# Patient Record
Sex: FEMALE | Race: WHITE | NOT HISPANIC OR LATINO | Employment: PART TIME | URBAN - METROPOLITAN AREA
[De-identification: names, ages, dates, MRNs, and addresses within clinical notes are randomized per-mention and may not be internally consistent; named-entity substitution may affect disease eponyms.]

---

## 2017-05-12 ENCOUNTER — HOSPITAL ENCOUNTER (OUTPATIENT)
Facility: MEDICAL CENTER | Age: 42
End: 2017-05-12
Attending: CLINICAL NURSE SPECIALIST
Payer: COMMERCIAL

## 2017-05-12 PROCEDURE — 87070 CULTURE OTHR SPECIMN AEROBIC: CPT

## 2017-05-12 PROCEDURE — 87205 SMEAR GRAM STAIN: CPT

## 2017-05-14 LAB
GRAM STN SPEC: NORMAL
SIGNIFICANT IND 70042: NORMAL
SITE SITE: NORMAL
SOURCE SOURCE: NORMAL

## 2017-05-16 LAB
BACTERIA GENITAL AEROBE CULT: NORMAL
GRAM STN SPEC: NORMAL
SIGNIFICANT IND 70042: NORMAL
SITE SITE: NORMAL
SOURCE SOURCE: NORMAL

## 2017-08-18 ENCOUNTER — HOSPITAL ENCOUNTER (OUTPATIENT)
Dept: RADIOLOGY | Facility: MEDICAL CENTER | Age: 42
End: 2017-08-18
Attending: CLINICAL NURSE SPECIALIST
Payer: COMMERCIAL

## 2017-08-18 DIAGNOSIS — N64.4 MASTODYNIA: ICD-10-CM

## 2017-08-18 PROCEDURE — 76642 ULTRASOUND BREAST LIMITED: CPT | Mod: LT

## 2017-08-18 PROCEDURE — G0204 DX MAMMO INCL CAD BI: HCPCS

## 2017-08-30 ENCOUNTER — HOSPITAL ENCOUNTER (OUTPATIENT)
Dept: LAB | Facility: MEDICAL CENTER | Age: 42
End: 2017-08-30
Attending: NURSE PRACTITIONER
Payer: COMMERCIAL

## 2017-08-30 LAB
FASTING STATUS PATIENT QL REPORTED: NORMAL
GFR SERPL CREATININE-BSD FRML MDRD: >60 ML/MIN/1.73 M 2

## 2017-08-30 PROCEDURE — 80061 LIPID PANEL: CPT

## 2017-08-30 PROCEDURE — 87086 URINE CULTURE/COLONY COUNT: CPT

## 2017-08-30 PROCEDURE — 84550 ASSAY OF BLOOD/URIC ACID: CPT

## 2017-08-30 PROCEDURE — 85652 RBC SED RATE AUTOMATED: CPT

## 2017-08-30 PROCEDURE — 81001 URINALYSIS AUTO W/SCOPE: CPT

## 2017-08-30 PROCEDURE — 36415 COLL VENOUS BLD VENIPUNCTURE: CPT

## 2017-08-30 PROCEDURE — 86431 RHEUMATOID FACTOR QUANT: CPT

## 2017-08-30 PROCEDURE — 85025 COMPLETE CBC W/AUTO DIFF WBC: CPT

## 2017-08-30 PROCEDURE — 82306 VITAMIN D 25 HYDROXY: CPT

## 2017-08-30 PROCEDURE — 80053 COMPREHEN METABOLIC PANEL: CPT

## 2017-08-30 PROCEDURE — 86038 ANTINUCLEAR ANTIBODIES: CPT

## 2017-08-30 PROCEDURE — 84443 ASSAY THYROID STIM HORMONE: CPT

## 2017-08-31 LAB
25(OH)D3 SERPL-MCNC: 34 NG/ML (ref 30–100)
ALBUMIN SERPL BCP-MCNC: 4.2 G/DL (ref 3.2–4.9)
ALBUMIN/GLOB SERPL: 1.6 G/DL
ALP SERPL-CCNC: 36 U/L (ref 30–99)
ALT SERPL-CCNC: 11 U/L (ref 2–50)
ANION GAP SERPL CALC-SCNC: 4 MMOL/L (ref 0–11.9)
APPEARANCE UR: CLEAR
AST SERPL-CCNC: 15 U/L (ref 12–45)
BACTERIA #/AREA URNS HPF: ABNORMAL /HPF
BASOPHILS # BLD AUTO: 0.7 % (ref 0–1.8)
BASOPHILS # BLD: 0.04 K/UL (ref 0–0.12)
BILIRUB SERPL-MCNC: 1.3 MG/DL (ref 0.1–1.5)
BILIRUB UR QL STRIP.AUTO: NEGATIVE
BUN SERPL-MCNC: 11 MG/DL (ref 8–22)
CALCIUM SERPL-MCNC: 9.2 MG/DL (ref 8.5–10.5)
CHLORIDE SERPL-SCNC: 106 MMOL/L (ref 96–112)
CHOLEST SERPL-MCNC: 138 MG/DL (ref 100–199)
CO2 SERPL-SCNC: 25 MMOL/L (ref 20–33)
COLOR UR: YELLOW
CREAT SERPL-MCNC: 0.71 MG/DL (ref 0.5–1.4)
CULTURE IF INDICATED INDCX: YES UA CULTURE
EOSINOPHIL # BLD AUTO: 0.06 K/UL (ref 0–0.51)
EOSINOPHIL NFR BLD: 1 % (ref 0–6.9)
EPI CELLS #/AREA URNS HPF: NEGATIVE /HPF
ERYTHROCYTE [DISTWIDTH] IN BLOOD BY AUTOMATED COUNT: 41.1 FL (ref 35.9–50)
ERYTHROCYTE [SEDIMENTATION RATE] IN BLOOD BY WESTERGREN METHOD: 10 MM/HOUR (ref 0–20)
GLOBULIN SER CALC-MCNC: 2.7 G/DL (ref 1.9–3.5)
GLUCOSE SERPL-MCNC: 74 MG/DL (ref 65–99)
GLUCOSE UR STRIP.AUTO-MCNC: NEGATIVE MG/DL
HCT VFR BLD AUTO: 41 % (ref 37–47)
HDLC SERPL-MCNC: 51 MG/DL
HGB BLD-MCNC: 13.8 G/DL (ref 12–16)
HYALINE CASTS #/AREA URNS LPF: ABNORMAL /LPF
IMM GRANULOCYTES # BLD AUTO: 0.02 K/UL (ref 0–0.11)
IMM GRANULOCYTES NFR BLD AUTO: 0.3 % (ref 0–0.9)
KETONES UR STRIP.AUTO-MCNC: 15 MG/DL
LDLC SERPL CALC-MCNC: 73 MG/DL
LEUKOCYTE ESTERASE UR QL STRIP.AUTO: ABNORMAL
LYMPHOCYTES # BLD AUTO: 1.74 K/UL (ref 1–4.8)
LYMPHOCYTES NFR BLD: 29.7 % (ref 22–41)
MCH RBC QN AUTO: 31.2 PG (ref 27–33)
MCHC RBC AUTO-ENTMCNC: 33.7 G/DL (ref 33.6–35)
MCV RBC AUTO: 92.6 FL (ref 81.4–97.8)
MICRO URNS: ABNORMAL
MONOCYTES # BLD AUTO: 0.5 K/UL (ref 0–0.85)
MONOCYTES NFR BLD AUTO: 8.5 % (ref 0–13.4)
NEUTROPHILS # BLD AUTO: 3.5 K/UL (ref 2–7.15)
NEUTROPHILS NFR BLD: 59.8 % (ref 44–72)
NITRITE UR QL STRIP.AUTO: NEGATIVE
NRBC # BLD AUTO: 0 K/UL
NRBC BLD AUTO-RTO: 0 /100 WBC
PH UR STRIP.AUTO: 6 [PH]
PLATELET # BLD AUTO: 258 K/UL (ref 164–446)
PMV BLD AUTO: 10.4 FL (ref 9–12.9)
POTASSIUM SERPL-SCNC: 4.1 MMOL/L (ref 3.6–5.5)
PROT SERPL-MCNC: 6.9 G/DL (ref 6–8.2)
PROT UR QL STRIP: NEGATIVE MG/DL
RBC # BLD AUTO: 4.43 M/UL (ref 4.2–5.4)
RBC # URNS HPF: ABNORMAL /HPF
RBC UR QL AUTO: ABNORMAL
RHEUMATOID FACT SER IA-ACNC: <10 IU/ML (ref 0–14)
SODIUM SERPL-SCNC: 135 MMOL/L (ref 135–145)
SP GR UR STRIP.AUTO: 1.02
TRIGL SERPL-MCNC: 68 MG/DL (ref 0–149)
TSH SERPL DL<=0.005 MIU/L-ACNC: 2.05 UIU/ML (ref 0.3–3.7)
URATE SERPL-MCNC: 4.3 MG/DL (ref 1.9–8.2)
UROBILINOGEN UR STRIP.AUTO-MCNC: 0.2 MG/DL
WBC # BLD AUTO: 5.9 K/UL (ref 4.8–10.8)
WBC #/AREA URNS HPF: ABNORMAL /HPF

## 2017-09-01 LAB
BACTERIA UR CULT: NORMAL
NUCLEAR IGG SER QL IA: NORMAL
SIGNIFICANT IND 70042: NORMAL
SOURCE SOURCE: NORMAL

## 2018-01-22 ENCOUNTER — OFFICE VISIT (OUTPATIENT)
Dept: URGENT CARE | Facility: PHYSICIAN GROUP | Age: 43
End: 2018-01-22
Payer: COMMERCIAL

## 2018-01-22 VITALS
OXYGEN SATURATION: 97 % | HEART RATE: 95 BPM | RESPIRATION RATE: 14 BRPM | SYSTOLIC BLOOD PRESSURE: 112 MMHG | DIASTOLIC BLOOD PRESSURE: 76 MMHG | TEMPERATURE: 100.1 F | WEIGHT: 156 LBS

## 2018-01-22 DIAGNOSIS — G93.31 POST-INFLUENZA SYNDROME: ICD-10-CM

## 2018-01-22 DIAGNOSIS — R06.02 SOB (SHORTNESS OF BREATH): ICD-10-CM

## 2018-01-22 DIAGNOSIS — J22 LOWER RESPIRATORY INFECTION (E.G., BRONCHITIS, PNEUMONIA, PNEUMONITIS, PULMONITIS): Primary | ICD-10-CM

## 2018-01-22 PROCEDURE — 94760 N-INVAS EAR/PLS OXIMETRY 1: CPT | Performed by: PHYSICIAN ASSISTANT

## 2018-01-22 PROCEDURE — 94640 AIRWAY INHALATION TREATMENT: CPT | Performed by: PHYSICIAN ASSISTANT

## 2018-01-22 PROCEDURE — 99204 OFFICE O/P NEW MOD 45 MIN: CPT | Mod: 25 | Performed by: PHYSICIAN ASSISTANT

## 2018-01-22 RX ORDER — ALBUTEROL SULFATE 90 UG/1
2 AEROSOL, METERED RESPIRATORY (INHALATION) EVERY 4 HOURS PRN
Qty: 1 INHALER | Refills: 0 | Status: SHIPPED | OUTPATIENT
Start: 2018-01-22

## 2018-01-22 RX ORDER — METHYLPREDNISOLONE 4 MG/1
TABLET ORAL
Qty: 1 KIT | Refills: 0 | Status: SHIPPED | OUTPATIENT
Start: 2018-01-22 | End: 2021-08-14

## 2018-01-22 RX ORDER — ALBUTEROL SULFATE 2.5 MG/3ML
2.5 SOLUTION RESPIRATORY (INHALATION) ONCE
Status: COMPLETED | OUTPATIENT
Start: 2018-01-22 | End: 2018-01-22

## 2018-01-22 RX ORDER — AZITHROMYCIN 250 MG/1
TABLET, FILM COATED ORAL
Qty: 6 TAB | Refills: 0 | Status: SHIPPED | OUTPATIENT
Start: 2018-01-22 | End: 2021-08-14

## 2018-01-22 RX ADMIN — ALBUTEROL SULFATE 2.5 MG: 2.5 SOLUTION RESPIRATORY (INHALATION) at 18:22

## 2018-01-22 ASSESSMENT — ENCOUNTER SYMPTOMS
CARDIOVASCULAR NEGATIVE: 1
FEVER: 1
COUGH: 1
SPUTUM PRODUCTION: 1
CHILLS: 1
MUSCULOSKELETAL NEGATIVE: 1
WHEEZING: 1
EYES NEGATIVE: 1
SHORTNESS OF BREATH: 1
GASTROINTESTINAL NEGATIVE: 1

## 2018-01-23 NOTE — PROGRESS NOTES
Subjective:      Syeda Norton is a 42 y.o. female who presents with Cough (SOB X7 DAYS)            HPI  Chief Complaint   Patient presents with   • Cough     SOB X7 DAYS       HPI:  Syeda Norton is a 42 y.o. Female who presents with lower resp infection x 7 days.  Started with flu with temp 101 max.  Has been out of work recovering.  HAving worsening chest congestion and wheeze and yellow mucous changes.  Fever returned today.  Has not tried any otc medications.  Patient denies HA, chest pain, palpitations, or n/v/d.  '  No flu vaccine.    No hx of pne or bronchitis.    History reviewed. No pertinent past medical history.    History reviewed. No pertinent surgical history.    History reviewed. No pertinent family history.  No pertinent family history.    Social History     Social History   • Marital status:      Spouse name: N/A   • Number of children: N/A   • Years of education: N/A     Occupational History   • Not on file.     Social History Main Topics   • Smoking status: Never Smoker   • Smokeless tobacco: Never Used   • Alcohol use Yes   • Drug use: No   • Sexual activity: Not on file     Other Topics Concern   • Not on file     Social History Narrative   • No narrative on file         Current Outpatient Prescriptions:   •  albuterol, 2 Puff, Inhalation, Q4HRS PRN  •  azithromycin, 2 tablets on day one, then 1 tablet PO daily until done.  •  MethylPREDNISolone, Take according to box instructions    No Known Allergies     Review of Systems   Constitutional: Positive for chills and fever.   HENT: Positive for congestion.    Eyes: Negative.    Respiratory: Positive for cough, sputum production, shortness of breath and wheezing.    Cardiovascular: Negative.    Gastrointestinal: Negative.    Genitourinary: Negative.    Musculoskeletal: Negative.    Skin: Negative.    All other systems reviewed and are negative.         Objective:     /76   Pulse 95   Temp 37.8 °C (100.1 °F)   Resp 14    Wt 70.8 kg (156 lb)   SpO2 97%   Breastfeeding? No      Physical Exam       Nursing note and vitals reviewed.    Constitutional:   Appropriately groomed, pleasant affect, well nourished, in NAD.    Head:   Normocephalic, atraumatic.    Eyes:   PERRLA, EOM's full, sclera white, conjunctiva not erythematous, and medial canthus without exudate bilaterally.    Ears:  Auricle and tragus non-tender to manipulation.  No pre-auricular lymphadenopathy or mastoid ttp.  EACs with mild cerumen bilaterally, not erythematous.  TM’s pearly gray with cone of light present and umbo and malleolus visible bilaterally.  No bulging or fluid bubbles present in middle ear.  Hearing grossly intact to voice.    Nose:  Nares not patent bilaterally.  Nasal mucosa edematous with white rhinorrhea bilaterally.  Mild sinus tenderness to percussion.    Throat:  Dentition wnl, mucosa moist without lesions.  Oropharynx erythematous, with no enlargement of the palatine tonsils bilaterally with no exudates.    Post nasal drainage  present.  Soft palate rises symmetrically bilaterally and uvula midline.      Neck: Neck supple, with mild anterior lymphadenopathy that is soft and mobile to palpation. Thyroid non-palpable without tenderness or nodules. No supraclavicular lymphadenopathy.    Lungs:  Respiratory effort not labored without accessory muscle use.  Lungs with inspiratory wheezes to auscultation. No rales. Rhonchi cleared with cough.    Heart:  RRR, without murmurs rubs or gallops.  Radial and dorsalis pedis pulse 2+ bilaterally.  No LE edema.    Musculoskeletal:  Gait non-antalgic with a narrow base.    Derm:  Skin without rashes or lesions with good turgor pressure.      Psychiatric:  Mood, affect, and judgement appropriate.       Assessment/Plan:     1. Lower respiratory infection (e.g., bronchitis, pneumonia, pneumonitis, pulmonitis)  albuterol 108 (90 Base) MCG/ACT Aero Soln inhalation aerosol    azithromycin (ZITHROMAX) 250 MG Tab     MethylPREDNISolone (MEDROL DOSEPAK) 4 MG Tablet Therapy Pack    albuterol (PROVENTIL) 2.5mg/3ml nebulizer solution 2.5 mg   2. SOB (shortness of breath)  albuterol 108 (90 Base) MCG/ACT Aero Soln inhalation aerosol    azithromycin (ZITHROMAX) 250 MG Tab    MethylPREDNISolone (MEDROL DOSEPAK) 4 MG Tablet Therapy Pack    albuterol (PROVENTIL) 2.5mg/3ml nebulizer solution 2.5 mg   3. Post-influenza syndrome  albuterol 108 (90 Base) MCG/ACT Aero Soln inhalation aerosol    azithromycin (ZITHROMAX) 250 MG Tab    MethylPREDNISolone (MEDROL DOSEPAK) 4 MG Tablet Therapy Pack    albuterol (PROVENTIL) 2.5mg/3ml nebulizer solution 2.5 mg      Patient presents with concern for early pne.  Post influenza based on symptoms.  On exam patient with sig wheeze and questionable crackles.  Pt refused X-ray.  Post albuterol neb 02 improved to 100%RA, adequate.  Recommended pushing fluids and delsym cough syrup.  Rx inhaler and medrol dosepak.  Plan to cover empirically for CAP.    Patient was in agreement with this treatment plan and seemed to understand without barriers. All questions were encouraged and answered.  Reviewed signs and symptoms of when to seek emergency medical care.     Please note that this dictation was created using voice recognition software.  I have made every reasonable attempt to correct obvious errors, but I expect there are errors of alin and possibly content that I did not discover before finalizing the note.

## 2018-01-23 NOTE — PATIENT INSTRUCTIONS
Flonase and nasal saline irrigation (netti pot or Jerrell Med Sinus Rinse).  Used distilled water or boiled tap water with nasal flushes, not straight tap water.  Humidifier at bedtime.  Hot steam showers to loosen up mucous.  Cough medicine at bedtime.  Lots of fluids, tea with honey.  Ibuprofen for headache, fever, chills.  Be sure to take with food.  Return if worsening: Yellow thicker mucus changes, worsening pain around the eyes and radiates to the teeth, and fever over 101°F.

## 2018-08-17 ENCOUNTER — HOSPITAL ENCOUNTER (OUTPATIENT)
Dept: RADIOLOGY | Facility: MEDICAL CENTER | Age: 43
End: 2018-08-17
Attending: NURSE PRACTITIONER
Payer: COMMERCIAL

## 2018-08-17 DIAGNOSIS — N64.4 MASTODYNIA: ICD-10-CM

## 2018-08-17 PROCEDURE — G0279 TOMOSYNTHESIS, MAMMO: HCPCS

## 2018-08-17 PROCEDURE — 76642 ULTRASOUND BREAST LIMITED: CPT | Mod: LT

## 2018-09-11 ENCOUNTER — HOSPITAL ENCOUNTER (OUTPATIENT)
Dept: LAB | Facility: MEDICAL CENTER | Age: 43
End: 2018-09-11
Attending: NURSE PRACTITIONER
Payer: COMMERCIAL

## 2018-09-11 LAB
25(OH)D3 SERPL-MCNC: 33 NG/ML (ref 30–100)
ALBUMIN SERPL BCP-MCNC: 4.5 G/DL (ref 3.2–4.9)
ALBUMIN/GLOB SERPL: 1.7 G/DL
ALP SERPL-CCNC: 37 U/L (ref 30–99)
ALT SERPL-CCNC: 11 U/L (ref 2–50)
ANION GAP SERPL CALC-SCNC: 7 MMOL/L (ref 0–11.9)
APPEARANCE UR: CLEAR
AST SERPL-CCNC: 14 U/L (ref 12–45)
BASOPHILS # BLD AUTO: 0.8 % (ref 0–1.8)
BASOPHILS # BLD: 0.05 K/UL (ref 0–0.12)
BILIRUB SERPL-MCNC: 0.4 MG/DL (ref 0.1–1.5)
BILIRUB UR QL STRIP.AUTO: NEGATIVE
BUN SERPL-MCNC: 13 MG/DL (ref 8–22)
CALCIUM SERPL-MCNC: 9.7 MG/DL (ref 8.5–10.5)
CHLORIDE SERPL-SCNC: 105 MMOL/L (ref 96–112)
CHOLEST SERPL-MCNC: 144 MG/DL (ref 100–199)
CO2 SERPL-SCNC: 27 MMOL/L (ref 20–33)
COLOR UR: YELLOW
CREAT SERPL-MCNC: 0.8 MG/DL (ref 0.5–1.4)
EOSINOPHIL # BLD AUTO: 0.04 K/UL (ref 0–0.51)
EOSINOPHIL NFR BLD: 0.6 % (ref 0–6.9)
ERYTHROCYTE [DISTWIDTH] IN BLOOD BY AUTOMATED COUNT: 40 FL (ref 35.9–50)
FASTING STATUS PATIENT QL REPORTED: NORMAL
GLOBULIN SER CALC-MCNC: 2.6 G/DL (ref 1.9–3.5)
GLUCOSE SERPL-MCNC: 78 MG/DL (ref 65–99)
GLUCOSE UR STRIP.AUTO-MCNC: NEGATIVE MG/DL
HCT VFR BLD AUTO: 42.9 % (ref 37–47)
HDLC SERPL-MCNC: 47 MG/DL
HGB BLD-MCNC: 14.5 G/DL (ref 12–16)
IMM GRANULOCYTES # BLD AUTO: 0.02 K/UL (ref 0–0.11)
IMM GRANULOCYTES NFR BLD AUTO: 0.3 % (ref 0–0.9)
KETONES UR STRIP.AUTO-MCNC: NEGATIVE MG/DL
LDLC SERPL CALC-MCNC: 88 MG/DL
LEUKOCYTE ESTERASE UR QL STRIP.AUTO: NEGATIVE
LYMPHOCYTES # BLD AUTO: 1.5 K/UL (ref 1–4.8)
LYMPHOCYTES NFR BLD: 22.7 % (ref 22–41)
MCH RBC QN AUTO: 31 PG (ref 27–33)
MCHC RBC AUTO-ENTMCNC: 33.8 G/DL (ref 33.6–35)
MCV RBC AUTO: 91.9 FL (ref 81.4–97.8)
MICRO URNS: NORMAL
MONOCYTES # BLD AUTO: 0.37 K/UL (ref 0–0.85)
MONOCYTES NFR BLD AUTO: 5.6 % (ref 0–13.4)
NEUTROPHILS # BLD AUTO: 4.62 K/UL (ref 2–7.15)
NEUTROPHILS NFR BLD: 70 % (ref 44–72)
NITRITE UR QL STRIP.AUTO: NEGATIVE
NRBC # BLD AUTO: 0 K/UL
NRBC BLD-RTO: 0 /100 WBC
PH UR STRIP.AUTO: 6.5 [PH]
PLATELET # BLD AUTO: 302 K/UL (ref 164–446)
PMV BLD AUTO: 10.3 FL (ref 9–12.9)
POTASSIUM SERPL-SCNC: 4 MMOL/L (ref 3.6–5.5)
PROT SERPL-MCNC: 7.1 G/DL (ref 6–8.2)
PROT UR QL STRIP: NEGATIVE MG/DL
RBC # BLD AUTO: 4.67 M/UL (ref 4.2–5.4)
RBC UR QL AUTO: NEGATIVE
SODIUM SERPL-SCNC: 139 MMOL/L (ref 135–145)
SP GR UR STRIP.AUTO: 1.01
T4 FREE SERPL-MCNC: 0.86 NG/DL (ref 0.53–1.43)
THYROPEROXIDASE AB SERPL-ACNC: 0.5 IU/ML (ref 0–9)
TRIGL SERPL-MCNC: 46 MG/DL (ref 0–149)
TSH SERPL DL<=0.005 MIU/L-ACNC: 2.05 UIU/ML (ref 0.38–5.33)
UROBILINOGEN UR STRIP.AUTO-MCNC: 0.2 MG/DL
VIT B12 SERPL-MCNC: 332 PG/ML (ref 211–911)
WBC # BLD AUTO: 6.6 K/UL (ref 4.8–10.8)

## 2018-09-11 PROCEDURE — 84443 ASSAY THYROID STIM HORMONE: CPT

## 2018-09-11 PROCEDURE — 82306 VITAMIN D 25 HYDROXY: CPT

## 2018-09-11 PROCEDURE — 81003 URINALYSIS AUTO W/O SCOPE: CPT

## 2018-09-11 PROCEDURE — 86376 MICROSOMAL ANTIBODY EACH: CPT

## 2018-09-11 PROCEDURE — 84439 ASSAY OF FREE THYROXINE: CPT

## 2018-09-11 PROCEDURE — 80053 COMPREHEN METABOLIC PANEL: CPT

## 2018-09-11 PROCEDURE — 80061 LIPID PANEL: CPT

## 2018-09-11 PROCEDURE — 36415 COLL VENOUS BLD VENIPUNCTURE: CPT

## 2018-09-11 PROCEDURE — 85025 COMPLETE CBC W/AUTO DIFF WBC: CPT

## 2018-09-11 PROCEDURE — 82607 VITAMIN B-12: CPT

## 2021-08-12 ENCOUNTER — HOSPITAL ENCOUNTER (OUTPATIENT)
Facility: MEDICAL CENTER | Age: 46
End: 2021-08-12
Attending: PHYSICIAN ASSISTANT
Payer: COMMERCIAL

## 2021-08-12 ENCOUNTER — OFFICE VISIT (OUTPATIENT)
Dept: URGENT CARE | Facility: PHYSICIAN GROUP | Age: 46
End: 2021-08-12
Payer: COMMERCIAL

## 2021-08-12 VITALS
TEMPERATURE: 101.1 F | OXYGEN SATURATION: 98 % | BODY MASS INDEX: 27.49 KG/M2 | DIASTOLIC BLOOD PRESSURE: 82 MMHG | WEIGHT: 165 LBS | RESPIRATION RATE: 20 BRPM | HEIGHT: 65 IN | HEART RATE: 100 BPM | SYSTOLIC BLOOD PRESSURE: 126 MMHG

## 2021-08-12 DIAGNOSIS — G44.89 OTHER HEADACHE SYNDROME: ICD-10-CM

## 2021-08-12 DIAGNOSIS — R11.2 NAUSEA AND VOMITING, INTRACTABILITY OF VOMITING NOT SPECIFIED, UNSPECIFIED VOMITING TYPE: ICD-10-CM

## 2021-08-12 DIAGNOSIS — R05.9 COUGH: ICD-10-CM

## 2021-08-12 DIAGNOSIS — R50.9 FEVER, UNSPECIFIED FEVER CAUSE: ICD-10-CM

## 2021-08-12 DIAGNOSIS — B34.9 NONSPECIFIC SYNDROME SUGGESTIVE OF VIRAL ILLNESS: ICD-10-CM

## 2021-08-12 PROCEDURE — U0003 INFECTIOUS AGENT DETECTION BY NUCLEIC ACID (DNA OR RNA); SEVERE ACUTE RESPIRATORY SYNDROME CORONAVIRUS 2 (SARS-COV-2) (CORONAVIRUS DISEASE [COVID-19]), AMPLIFIED PROBE TECHNIQUE, MAKING USE OF HIGH THROUGHPUT TECHNOLOGIES AS DESCRIBED BY CMS-2020-01-R: HCPCS

## 2021-08-12 PROCEDURE — U0005 INFEC AGEN DETEC AMPLI PROBE: HCPCS

## 2021-08-12 PROCEDURE — 99214 OFFICE O/P EST MOD 30 MIN: CPT | Performed by: PHYSICIAN ASSISTANT

## 2021-08-12 RX ORDER — HYDROXYCHLOROQUINE SULFATE 200 MG/1
TABLET, FILM COATED ORAL
COMMUNITY
Start: 2021-06-15 | End: 2021-08-14

## 2021-08-12 RX ORDER — ONDANSETRON 4 MG/1
4 TABLET, ORALLY DISINTEGRATING ORAL ONCE
Status: COMPLETED | OUTPATIENT
Start: 2021-08-12 | End: 2021-08-12

## 2021-08-12 RX ORDER — ONDANSETRON 4 MG/1
4 TABLET, ORALLY DISINTEGRATING ORAL EVERY 6 HOURS PRN
Qty: 15 TABLET | Refills: 0 | Status: SHIPPED | OUTPATIENT
Start: 2021-08-12 | End: 2021-08-14

## 2021-08-12 RX ORDER — PROPRANOLOL HYDROCHLORIDE 40 MG/1
20-40 TABLET ORAL
COMMUNITY
Start: 2021-05-19

## 2021-08-12 RX ORDER — KETOROLAC TROMETHAMINE 30 MG/ML
30 INJECTION, SOLUTION INTRAMUSCULAR; INTRAVENOUS ONCE
Status: COMPLETED | OUTPATIENT
Start: 2021-08-12 | End: 2021-08-12

## 2021-08-12 RX ADMIN — ONDANSETRON 4 MG: 4 TABLET, ORALLY DISINTEGRATING ORAL at 18:36

## 2021-08-12 RX ADMIN — KETOROLAC TROMETHAMINE 30 MG: 30 INJECTION, SOLUTION INTRAMUSCULAR; INTRAVENOUS at 18:36

## 2021-08-12 ASSESSMENT — ENCOUNTER SYMPTOMS
MYALGIAS: 1
SHORTNESS OF BREATH: 0
ABDOMINAL PAIN: 0
SORE THROAT: 1
FEVER: 1
EYE PAIN: 0
VOMITING: 1
CONSTIPATION: 0
NAUSEA: 1
HEADACHES: 1
CHILLS: 1
DIARRHEA: 0
COUGH: 1

## 2021-08-13 DIAGNOSIS — B34.9 NONSPECIFIC SYNDROME SUGGESTIVE OF VIRAL ILLNESS: ICD-10-CM

## 2021-08-13 DIAGNOSIS — R05.9 COUGH: ICD-10-CM

## 2021-08-13 DIAGNOSIS — R50.9 FEVER, UNSPECIFIED FEVER CAUSE: ICD-10-CM

## 2021-08-13 DIAGNOSIS — G44.89 OTHER HEADACHE SYNDROME: ICD-10-CM

## 2021-08-13 DIAGNOSIS — R11.2 NAUSEA AND VOMITING, INTRACTABILITY OF VOMITING NOT SPECIFIED, UNSPECIFIED VOMITING TYPE: ICD-10-CM

## 2021-08-13 LAB — COVID ORDER STATUS COVID19: NORMAL

## 2021-08-13 NOTE — PROGRESS NOTES
"Subjective:   Syeda Norton is a 45 y.o. female who presents for Other (covud,nausas,cough,feels like having a fever,vomiting cant not keep anything down,x1 week)      HPI:  This is a 45-year-old female who is returned from [place] recently since returning is noted onset of nausea, cough, body aches, fever, worsening vomiting as well as a headache.  She has been taking acetaminophen and Benadryl which is only mildly helped out her symptoms.  She reports that she is \"has not been able to keep anything down today\".  She has had around 2 episodes of urination but has been taking small sips of water.  She has not had any difficulty breathing but is noticed a cough.  She has no specific sick contacts.    Review of Systems   Constitutional: Positive for chills, fever and malaise/fatigue.   HENT: Positive for congestion and sore throat. Negative for ear pain.    Eyes: Negative for pain.   Respiratory: Positive for cough. Negative for shortness of breath.    Cardiovascular: Negative for chest pain.   Gastrointestinal: Positive for nausea and vomiting. Negative for abdominal pain, constipation and diarrhea.   Genitourinary: Negative for dysuria.   Musculoskeletal: Positive for myalgias.   Skin: Negative for rash.   Neurological: Positive for headaches.       Medications:    • albuterol Aers  • azithromycin Tabs  • ketorolac  • methylPREDNISolone Tbpk  • ondansetron  • ondansetron Tbdp    Allergies: Patient has no known allergies.    Problem List: Syeda Norton does not have a problem list on file.    Surgical History:  No past surgical history on file.    Past Social Hx: Syeda Norton  reports that she has never smoked. She has never used smokeless tobacco. She reports current alcohol use. She reports that she does not use drugs.     Past Family Hx:  Syeda Norton family history is not on file.     Problem list, medications, and allergies reviewed by myself today in Epic.     " "Objective:     /82   Pulse 100   Temp (!) 38.4 °C (101.1 °F) (Temporal)   Resp 20   Ht 1.651 m (5' 5\")   Wt 74.8 kg (165 lb)   SpO2 98%   BMI 27.46 kg/m²     Physical Exam  Vitals reviewed.   Constitutional:       Appearance: Normal appearance. She is ill-appearing. She is not toxic-appearing.      Comments: Curled up on exam table, reluctant to participate in exam   HENT:      Head: Normocephalic and atraumatic.      Right Ear: External ear normal.      Left Ear: External ear normal.      Nose: Congestion and rhinorrhea present.      Mouth/Throat:      Mouth: Mucous membranes are moist.   Eyes:      General: No scleral icterus.     Extraocular Movements: Extraocular movements intact.      Conjunctiva/sclera: Conjunctivae normal.      Pupils: Pupils are equal, round, and reactive to light.   Cardiovascular:      Rate and Rhythm: Regular rhythm. Tachycardia present.   Pulmonary:      Effort: Pulmonary effort is normal.   Musculoskeletal:         General: Normal range of motion.      Cervical back: Normal range of motion. No rigidity.   Skin:     General: Skin is warm and dry.      Capillary Refill: Capillary refill takes less than 2 seconds.      Findings: No rash.   Neurological:      General: No focal deficit present.      Mental Status: She is alert and oriented to person, place, and time.      Motor: No weakness.      Gait: Gait normal.         Assessment/Plan:     Diagnosis and associated orders:     1. Nonspecific syndrome suggestive of viral illness  COVID/SARS CoV-2 PCR    ketorolac (TORADOL) injection 30 mg    ondansetron (ZOFRAN ODT) dispertab 4 mg    ondansetron (ZOFRAN ODT) 4 MG TABLET DISPERSIBLE   2. Other headache syndrome  COVID/SARS CoV-2 PCR    ketorolac (TORADOL) injection 30 mg    ondansetron (ZOFRAN ODT) dispertab 4 mg    ondansetron (ZOFRAN ODT) 4 MG TABLET DISPERSIBLE   3. Nausea and vomiting, intractability of vomiting not specified, unspecified vomiting type  COVID/SARS CoV-2 PCR "    ketorolac (TORADOL) injection 30 mg    ondansetron (ZOFRAN ODT) dispertab 4 mg    ondansetron (ZOFRAN ODT) 4 MG TABLET DISPERSIBLE   4. Cough  COVID/SARS CoV-2 PCR    ketorolac (TORADOL) injection 30 mg    ondansetron (ZOFRAN ODT) dispertab 4 mg    ondansetron (ZOFRAN ODT) 4 MG TABLET DISPERSIBLE   5. Fever, unspecified fever cause  COVID/SARS CoV-2 PCR    ketorolac (TORADOL) injection 30 mg    ondansetron (ZOFRAN ODT) dispertab 4 mg    ondansetron (ZOFRAN ODT) 4 MG TABLET DISPERSIBLE      Comments/MDM:     • Patient is obviously not feeling well but is overall nontoxic in appearance.  She displays no gross neurologic deficits.  Exam somewhat complicated by reluctance to participate in the exam  • We will treat clinic symptoms with Zofran as well as Toradol.  Zofran sent to pharmacy as well.  Counseled on modest doses of fluids every 30 minutes to try to maintain hydration.  If unable to stay hydrated within a 24 to 40-hour timeframe advised to go to ER for fluid resuscitation.  Patient appears euvolemic currently and is making good tears and heart rate normalizes when she calms down.  • Discussed over-the-counter acetaminophen, ibuprofen, and diphenhydramine dosing, recommended do not exceed listed limits  • Strong recommendation to quarantine/isolate family  • No signs of endorgan damage or hypoxia at today's visit         Differential diagnosis, natural history, supportive care, and indications for immediate follow-up discussed.    Advised the patient to follow-up with the primary care physician for recheck, reevaluation, and consideration of further management.    Please note that this dictation was created using voice recognition software. I have made a reasonable attempt to correct obvious errors, but I expect that there are errors of grammar and possibly content that I did not discover before finalizing the note.    This note was electronically signed by Alberto Mcneill PA-C

## 2021-08-14 ENCOUNTER — HOSPITAL ENCOUNTER (EMERGENCY)
Facility: MEDICAL CENTER | Age: 46
End: 2021-08-14
Attending: EMERGENCY MEDICINE
Payer: COMMERCIAL

## 2021-08-14 ENCOUNTER — APPOINTMENT (OUTPATIENT)
Dept: RADIOLOGY | Facility: MEDICAL CENTER | Age: 46
End: 2021-08-14
Attending: EMERGENCY MEDICINE
Payer: COMMERCIAL

## 2021-08-14 VITALS
RESPIRATION RATE: 20 BRPM | TEMPERATURE: 99.3 F | DIASTOLIC BLOOD PRESSURE: 73 MMHG | SYSTOLIC BLOOD PRESSURE: 115 MMHG | BODY MASS INDEX: 27.55 KG/M2 | OXYGEN SATURATION: 95 % | WEIGHT: 165.34 LBS | HEART RATE: 92 BPM | HEIGHT: 65 IN

## 2021-08-14 DIAGNOSIS — U07.1 PNEUMONIA DUE TO COVID-19 VIRUS: ICD-10-CM

## 2021-08-14 DIAGNOSIS — J12.82 PNEUMONIA DUE TO COVID-19 VIRUS: ICD-10-CM

## 2021-08-14 LAB
ALBUMIN SERPL BCP-MCNC: 3.7 G/DL (ref 3.2–4.9)
ALBUMIN/GLOB SERPL: 1.1 G/DL
ALP SERPL-CCNC: 50 U/L (ref 30–99)
ALT SERPL-CCNC: 33 U/L (ref 2–50)
ANION GAP SERPL CALC-SCNC: 10 MMOL/L (ref 7–16)
AST SERPL-CCNC: 38 U/L (ref 12–45)
BASOPHILS # BLD AUTO: 0 % (ref 0–1.8)
BASOPHILS # BLD: 0 K/UL (ref 0–0.12)
BILIRUB SERPL-MCNC: 0.3 MG/DL (ref 0.1–1.5)
BUN SERPL-MCNC: 10 MG/DL (ref 8–22)
CALCIUM SERPL-MCNC: 9 MG/DL (ref 8.4–10.2)
CHLORIDE SERPL-SCNC: 99 MMOL/L (ref 96–112)
CO2 SERPL-SCNC: 26 MMOL/L (ref 20–33)
CREAT SERPL-MCNC: 0.71 MG/DL (ref 0.5–1.4)
D DIMER PPP IA.FEU-MCNC: 0.93 UG/ML (FEU) (ref 0–0.5)
EKG IMPRESSION: NORMAL
EOSINOPHIL # BLD AUTO: 0.11 K/UL (ref 0–0.51)
EOSINOPHIL NFR BLD: 3 % (ref 0–6.9)
ERYTHROCYTE [DISTWIDTH] IN BLOOD BY AUTOMATED COUNT: 38.1 FL (ref 35.9–50)
FLUAV RNA SPEC QL NAA+PROBE: NEGATIVE
FLUBV RNA SPEC QL NAA+PROBE: NEGATIVE
GLOBULIN SER CALC-MCNC: 3.4 G/DL (ref 1.9–3.5)
GLUCOSE SERPL-MCNC: 93 MG/DL (ref 65–99)
HCG SERPL QL: NEGATIVE
HCT VFR BLD AUTO: 41.9 % (ref 37–47)
HGB BLD-MCNC: 14.8 G/DL (ref 12–16)
LYMPHOCYTES # BLD AUTO: 0.76 K/UL (ref 1–4.8)
LYMPHOCYTES NFR BLD: 20 % (ref 22–41)
MANUAL DIFF BLD: NORMAL
MCH RBC QN AUTO: 31.6 PG (ref 27–33)
MCHC RBC AUTO-ENTMCNC: 35.3 G/DL (ref 33.6–35)
MCV RBC AUTO: 89.5 FL (ref 81.4–97.8)
MONOCYTES # BLD AUTO: 0.04 K/UL (ref 0–0.85)
MONOCYTES NFR BLD AUTO: 1 % (ref 0–13.4)
NEUTROPHILS # BLD AUTO: 2.89 K/UL (ref 2–7.15)
NEUTROPHILS NFR BLD: 73 % (ref 44–72)
NEUTS BAND NFR BLD MANUAL: 3 % (ref 0–10)
NRBC # BLD AUTO: 0 K/UL
NRBC BLD-RTO: 0 /100 WBC
PLATELET # BLD AUTO: 190 K/UL (ref 164–446)
PLATELET BLD QL SMEAR: NORMAL
PMV BLD AUTO: 9.6 FL (ref 9–12.9)
POTASSIUM SERPL-SCNC: 3.9 MMOL/L (ref 3.6–5.5)
PROT SERPL-MCNC: 7.1 G/DL (ref 6–8.2)
RBC # BLD AUTO: 4.68 M/UL (ref 4.2–5.4)
RBC BLD AUTO: NORMAL
RSV RNA SPEC QL NAA+PROBE: NEGATIVE
SARS-COV-2 RNA RESP QL NAA+PROBE: DETECTED
SARS-COV-2 RNA RESP QL NAA+PROBE: DETECTED
SODIUM SERPL-SCNC: 135 MMOL/L (ref 135–145)
SPECIMEN SOURCE: ABNORMAL
SPECIMEN SOURCE: ABNORMAL
TROPONIN T SERPL-MCNC: <6 NG/L (ref 6–19)
VARIANT LYMPHS BLD QL SMEAR: NORMAL
WBC # BLD AUTO: 3.8 K/UL (ref 4.8–10.8)

## 2021-08-14 PROCEDURE — 84484 ASSAY OF TROPONIN QUANT: CPT

## 2021-08-14 PROCEDURE — 93005 ELECTROCARDIOGRAM TRACING: CPT | Performed by: EMERGENCY MEDICINE

## 2021-08-14 PROCEDURE — 85007 BL SMEAR W/DIFF WBC COUNT: CPT

## 2021-08-14 PROCEDURE — 96375 TX/PRO/DX INJ NEW DRUG ADDON: CPT

## 2021-08-14 PROCEDURE — 84703 CHORIONIC GONADOTROPIN ASSAY: CPT

## 2021-08-14 PROCEDURE — 71275 CT ANGIOGRAPHY CHEST: CPT

## 2021-08-14 PROCEDURE — C9803 HOPD COVID-19 SPEC COLLECT: HCPCS | Performed by: EMERGENCY MEDICINE

## 2021-08-14 PROCEDURE — 96374 THER/PROPH/DIAG INJ IV PUSH: CPT

## 2021-08-14 PROCEDURE — 80053 COMPREHEN METABOLIC PANEL: CPT

## 2021-08-14 PROCEDURE — 0241U HCHG SARS-COV-2 COVID-19 NFCT DS RESP RNA 4 TRGT MIC: CPT

## 2021-08-14 PROCEDURE — 85379 FIBRIN DEGRADATION QUANT: CPT

## 2021-08-14 PROCEDURE — 700111 HCHG RX REV CODE 636 W/ 250 OVERRIDE (IP): Performed by: EMERGENCY MEDICINE

## 2021-08-14 PROCEDURE — 99285 EMERGENCY DEPT VISIT HI MDM: CPT

## 2021-08-14 PROCEDURE — 85027 COMPLETE CBC AUTOMATED: CPT

## 2021-08-14 PROCEDURE — 71045 X-RAY EXAM CHEST 1 VIEW: CPT

## 2021-08-14 PROCEDURE — 700105 HCHG RX REV CODE 258: Performed by: EMERGENCY MEDICINE

## 2021-08-14 PROCEDURE — 36415 COLL VENOUS BLD VENIPUNCTURE: CPT

## 2021-08-14 PROCEDURE — 700117 HCHG RX CONTRAST REV CODE 255: Performed by: EMERGENCY MEDICINE

## 2021-08-14 RX ORDER — MORPHINE SULFATE 4 MG/ML
4 INJECTION, SOLUTION INTRAMUSCULAR; INTRAVENOUS ONCE
Status: DISCONTINUED | OUTPATIENT
Start: 2021-08-14 | End: 2021-08-14

## 2021-08-14 RX ORDER — HYDROMORPHONE HYDROCHLORIDE 1 MG/ML
1 INJECTION, SOLUTION INTRAMUSCULAR; INTRAVENOUS; SUBCUTANEOUS ONCE
Status: COMPLETED | OUTPATIENT
Start: 2021-08-14 | End: 2021-08-14

## 2021-08-14 RX ORDER — ONDANSETRON 2 MG/ML
4 INJECTION INTRAMUSCULAR; INTRAVENOUS ONCE
Status: COMPLETED | OUTPATIENT
Start: 2021-08-14 | End: 2021-08-14

## 2021-08-14 RX ORDER — OXYCODONE HYDROCHLORIDE AND ACETAMINOPHEN 5; 325 MG/1; MG/1
1 TABLET ORAL EVERY 6 HOURS PRN
Qty: 15 TABLET | Refills: 0 | Status: SHIPPED | OUTPATIENT
Start: 2021-08-14 | End: 2021-08-18

## 2021-08-14 RX ORDER — SODIUM CHLORIDE 9 MG/ML
1000 INJECTION, SOLUTION INTRAVENOUS ONCE
Status: COMPLETED | OUTPATIENT
Start: 2021-08-14 | End: 2021-08-14

## 2021-08-14 RX ADMIN — SODIUM CHLORIDE 1000 ML: 9 INJECTION, SOLUTION INTRAVENOUS at 11:44

## 2021-08-14 RX ADMIN — IOHEXOL 60 ML: 350 INJECTION, SOLUTION INTRAVENOUS at 12:45

## 2021-08-14 RX ADMIN — ONDANSETRON 4 MG: 2 INJECTION INTRAMUSCULAR; INTRAVENOUS at 12:07

## 2021-08-14 RX ADMIN — HYDROMORPHONE HYDROCHLORIDE 1 MG: 1 INJECTION, SOLUTION INTRAMUSCULAR; INTRAVENOUS; SUBCUTANEOUS at 12:05

## 2021-08-14 NOTE — ED NOTES
"Pt recently traveled to [place] for a conference and the room mate from her conference has been diagnosed with COVID.  Pt went to urgent care and her  states she had a COVID test done and they do not know the result.  Pt is anxious and tearful and rates her pain 10/10 and states, \"my lungs hurt\".  "

## 2021-08-14 NOTE — ED TRIAGE NOTES
"This is a 45-year-old female who recently returned from [place],  She reports experiencing recurrence of nausea, cough, generalized body aches, fever, and worsening episodic vomiting with  headache.  She describes observing blood in her sputum this AM.  Chief Complaint   Patient presents with   • N/V   • Headache   • Body Aches   • Blood in Sputum     /69   Pulse (!) 110   Temp 36.1 °C (97 °F) (Temporal)   Resp 20   Ht 1.651 m (5' 5\")   Wt 75 kg (165 lb 5.5 oz)   SpO2 95%   BMI 27.51 kg/m²      "

## 2021-08-14 NOTE — DISCHARGE INSTRUCTIONS
Take oxycodone for more severe pain.  Take a daily aspirin.  You may take limited ibuprofen and Tylenol but do not mix Tylenol and Percocet.  Rest and drink plenty of fluids.  Return for worsening shortness of breath, leg swelling, changing chest pain or ill appearance.  Continue to home isolate until you start to improve.

## 2021-08-14 NOTE — ED PROVIDER NOTES
"ED Provider Note    CHIEF COMPLAINT  Chief Complaint   Patient presents with   • N/V   • Headache   • Body Aches   • Blood in Sputum       HPI  Syeda Norton is a 45 y.o. female who presents severe headache, severe chest pain, cough, shortness of breath, sore throat, vomiting, diarrhea decreased taste but preserved smell.  She is unvaccinated against Covid.  She went to a conference in [place] and roomed with the person from the second to the fifth who was later diagnosed with Covid.  Her symptoms started on the fifth.  No history of DVT or PE but she has a family history of DVT and PE.  Denies hypertension diabetes asthma tobacco use pregnancy.    REVIEW OF SYSTEMS  Pertinent positives include: Cough, shortness of breath, chest pain, headache, body aches.  Pertinent negatives include: Definite fever, leg swelling, calf pain, rash, DVT or PE.  10+ systems reviewed and negative.      PAST MEDICAL HISTORY  Denies    FAMILY HISTORY  Thromboembolic disease    SOCIAL HISTORY  Social History     Tobacco Use   • Smoking status: Never Smoker   • Smokeless tobacco: Never Used   Substance Use Topics   • Alcohol use: Yes     Comment: Occasionally   • Drug use: No     Social History     Substance and Sexual Activity   Drug Use No       CURRENT MEDICATIONS  Current Facility-Administered Medications   • NS infusion 1,000 mL  1,000 mL Intravenous Once Robby Oliver M.D.         Current Outpatient Medications   Medication Sig Dispense Refill   • propranolol (INDERAL) 40 MG Tab Take 20-40 mg by mouth 1 time a day as needed (anxiety).     • albuterol 108 (90 Base) MCG/ACT Aero Soln inhalation aerosol Inhale 2 Puffs by mouth every four hours as needed for Shortness of Breath. 1 Inhaler 0       ALLERGIES  No Known Allergies    PHYSICAL EXAM  VITAL SIGNS: /81   Pulse (!) 112   Temp 36.1 °C (97 °F) (Temporal)   Resp (!) 30   Ht 1.651 m (5' 5\")   Wt 75 kg (165 lb 5.5 oz)   SpO2 97%   BMI 27.51 kg/m²   Reviewed and " echocardiac  Constitutional: Well developed, Well nourished, in some distress due to pain.  HENT: Normocephalic, atraumatic, bilateral external ears normal, Wearing mask.   Eyes: PERRLA, conjunctiva pink, no scleral icterus.   Cardiovascular: Tachycardic regular S1-S2 without murmur, rub, gallop.  No dependent edema or calf tenderness.  Respiratory: No rales, rhonchi, wheeze or cough.  Gastrointestinal: Soft, nontender, nondistended, no organomegaly.  Skin: No erythema, no rash.   Genitourinary:  No costovertebral angle tenderness.   Neurologic: Alert & oriented x 3, cranial nerves 2-12 intact by passive exam.  No focal deficit noted.  Psychiatric: Affect normal, Judgment normal, Mood normal.     DIFFERENTIAL DIAGNOSIS:  Covid pneumonia, hypoxia, pulmonary embolism, myocarditis, myocardial infarction.    EKG  EKG Interpretation 1:34 PM    Interpreted by me.  Indication: Chest pain    Rhythm: normal sinus   Rate: Normal at 99  Axis: normal  Ectopy: none  Conduction: normal  ST/T Waves: Nonspecific anterior ST change Q Waves: none  R Wave progression: normal    Clinical Impression: Normal sinus rhythm with nonspecific anterior ST change      RADIOLOGY/PROCEDURES  CT-CTA CHEST PULMONARY ARTERY W/ RECONS   Final Result      1.  No CT evidence for pulmonary emboli.   2.  Peripherally peripheral ill-defined pulmonary parenchymal opacities suggesting multifocal pneumonitis, consistent with Covid 19 pneumonia.   3.  Visualized liver shows multiple low-attenuation lesions, likely cysts, although difficult to evaluate on this study.            DX-CHEST-LIMITED (1 VIEW)   Final Result      Bilateral peripheral airspace opacification is consistent with Covid pneumonia          LABORATORY:  Results for orders placed or performed during the hospital encounter of 08/14/21   CBC WITH DIFFERENTIAL   Result Value Ref Range    WBC 3.8 (L) 4.8 - 10.8 K/uL    RBC 4.68 4.20 - 5.40 M/uL    Hemoglobin 14.8 12.0 - 16.0 g/dL    Hematocrit  41.9 37.0 - 47.0 %    MCV 89.5 81.4 - 97.8 fL    MCH 31.6 27.0 - 33.0 pg    MCHC 35.3 (H) 33.6 - 35.0 g/dL    RDW 38.1 35.9 - 50.0 fL    Platelet Count 190 164 - 446 K/uL    MPV 9.6 9.0 - 12.9 fL    Neutrophils-Polys 73.00 (H) 44.00 - 72.00 %    Lymphocytes 20.00 (L) 22.00 - 41.00 %    Monocytes 1.00 0.00 - 13.40 %    Eosinophils 3.00 0.00 - 6.90 %    Basophils 0.00 0.00 - 1.80 %    Nucleated RBC 0.00 /100 WBC    Neutrophils (Absolute) 2.89 2.00 - 7.15 K/uL    Lymphs (Absolute) 0.76 (L) 1.00 - 4.80 K/uL    Monos (Absolute) 0.04 0.00 - 0.85 K/uL    Eos (Absolute) 0.11 0.00 - 0.51 K/uL    Baso (Absolute) 0.00 0.00 - 0.12 K/uL    NRBC (Absolute) 0.00 K/uL   Comp Metabolic Panel   Result Value Ref Range    Sodium 135 135 - 145 mmol/L    Potassium 3.9 3.6 - 5.5 mmol/L    Chloride 99 96 - 112 mmol/L    Co2 26 20 - 33 mmol/L    Anion Gap 10.0 7.0 - 16.0    Glucose 93 65 - 99 mg/dL    Bun 10 8 - 22 mg/dL    Creatinine 0.71 0.50 - 1.40 mg/dL    Calcium 9.0 8.4 - 10.2 mg/dL    AST(SGOT) 38 12 - 45 U/L    ALT(SGPT) 33 2 - 50 U/L    Alkaline Phosphatase 50 30 - 99 U/L    Total Bilirubin 0.3 0.1 - 1.5 mg/dL    Albumin 3.7 3.2 - 4.9 g/dL    Total Protein 7.1 6.0 - 8.2 g/dL    Globulin 3.4 1.9 - 3.5 g/dL    A-G Ratio 1.1 g/dL   D-DIMER   Result Value Ref Range    D-Dimer Screen 0.93 (H) 0.00 - 0.50 ug/mL (FEU)   HCG QUAL SERUM   Result Value Ref Range    Beta-Hcg Qualitative Serum Negative Negative   COV-2, FLU A/B, AND RSV BY PCR (2-4 HOURS CEPHEID): Collect NP swab in VTM    Specimen: Respirate   Result Value Ref Range    Influenza virus A RNA Negative Negative    Influenza virus B, PCR Negative Negative    RSV, PCR Negative Negative    SARS-CoV-2 by PCR DETECTED (AA)     SARS-CoV-2 Source NP Swab    TROPONIN   Result Value Ref Range    Troponin T <6 6 - 19 ng/L       INTERVENTIONS:  Medications   NS infusion 1,000 mL (0 mL Intravenous Stopped 8/14/21 1244)   ondansetron (ZOFRAN) syringe/vial injection 4 mg (4 mg Intravenous  Given 8/14/21 1207)   HYDROmorphone (Dilaudid) injection 1 mg (1 mg Intravenous Given 8/14/21 1205)   iohexol (OMNIPAQUE) 350 mg/mL (60 mL Intravenous Given 8/14/21 1245)     Response: Good improvement in pain with hydromorphone, improvement in heart rate with infusion.    COURSE & MEDICAL DECISION MAKING  Ill-appearing patient presents with Covid pneumonia with severe chest pain and headache pain but no hypoxia, no myocarditis, no myocardial infarction and no pulmonary embolism.  There is no indication of secondary bacterial pneumonia.  She is not hypoxic with exercise.  She had good response to opiate analgesics.    PLAN:  New Prescriptions    OXYCODONE-ACETAMINOPHEN (PERCOCET) 5-325 MG TAB    Take 1 Tablet by mouth every 6 hours as needed (moderate to severe pain) for up to 4 days.   Home isolate  Daily aspirin  NSAIDs as needed    Prescription monitoring queried and score 0  Opiate risk tool utilized and patient low risk  Informed consent obtained for opiate analgesic  Indication opiate analgesic Covid infection headache and chest pain    covid handout given  Return for worsening shortness of breath, changing chest pain, ill appearance, painful swollen leg    CONDITION: Stable, improved.    FINAL IMPRESSION  1. Pneumonia due to COVID-19 virus          Electronically signed by: Robby Oliver M.D., 8/14/2021 11:20 AM

## 2021-08-14 NOTE — ED NOTES
Med Rec completed per patient and family   Allergies reviewed  No ORAL antibiotics in last 30 days

## 2024-06-21 ENCOUNTER — OFFICE VISIT (OUTPATIENT)
Dept: URGENT CARE | Facility: CLINIC | Age: 49
End: 2024-06-21
Payer: COMMERCIAL

## 2024-06-21 VITALS
WEIGHT: 187.2 LBS | TEMPERATURE: 97.6 F | SYSTOLIC BLOOD PRESSURE: 90 MMHG | HEART RATE: 76 BPM | HEIGHT: 65 IN | DIASTOLIC BLOOD PRESSURE: 64 MMHG | RESPIRATION RATE: 16 BRPM | OXYGEN SATURATION: 98 % | BODY MASS INDEX: 31.19 KG/M2

## 2024-06-21 DIAGNOSIS — R10.31 RLQ ABDOMINAL PAIN: ICD-10-CM

## 2024-06-21 LAB
APPEARANCE UR: CLEAR
BILIRUB UR STRIP-MCNC: NORMAL MG/DL
COLOR UR AUTO: YELLOW
GLUCOSE UR STRIP.AUTO-MCNC: NORMAL MG/DL
KETONES UR STRIP.AUTO-MCNC: NORMAL MG/DL
LEUKOCYTE ESTERASE UR QL STRIP.AUTO: NORMAL
NITRITE UR QL STRIP.AUTO: NORMAL
PH UR STRIP.AUTO: 6 [PH] (ref 5–8)
PROT UR QL STRIP: NORMAL MG/DL
RBC UR QL AUTO: NORMAL
SP GR UR STRIP.AUTO: >=1.03
UROBILINOGEN UR STRIP-MCNC: 0.2 MG/DL

## 2024-06-21 PROCEDURE — 3074F SYST BP LT 130 MM HG: CPT | Performed by: PHYSICIAN ASSISTANT

## 2024-06-21 PROCEDURE — 81002 URINALYSIS NONAUTO W/O SCOPE: CPT | Performed by: PHYSICIAN ASSISTANT

## 2024-06-21 PROCEDURE — 3078F DIAST BP <80 MM HG: CPT | Performed by: PHYSICIAN ASSISTANT

## 2024-06-21 PROCEDURE — 99214 OFFICE O/P EST MOD 30 MIN: CPT | Performed by: PHYSICIAN ASSISTANT

## 2024-06-21 NOTE — PROGRESS NOTES
Subjective     Syeda Norton is a 48 y.o. female who presents with RLQ Pain (X 1 day )    PMH:  has no past medical history on file.  MEDS:   Current Outpatient Medications:     propranolol (INDERAL) 40 MG Tab, Take 20-40 mg by mouth 1 time a day as needed (anxiety)., Disp: , Rfl:   ALLERGIES: No Known Allergies  SURGHX: History reviewed. No pertinent surgical history.  SOCHX:  reports that she has never smoked. She has never used smokeless tobacco. She reports current alcohol use. She reports that she does not use drugs.  FH: Reviewed with patient, not pertinent to this visit.           Patient presents with:  RLQ Pain: X that started last night.  Patient denies nausea, vomiting or diarrhea, however she has not had much to eat or drink today due to RLQ pain.  Patient states pain is worse in certain positions, certainly with palpation of the right lower quadrant area.  Patient drove herself here, did not have any discomfort going over bumps.  Pt has hx of ovarian cysts in past this feels similar but not exactly the same.  Patient has taken some over-the-counter ibuprofen last night with no real change in her symptoms.  Patient denies dysuria, hematuria.  Patient is perimenopausal also has irregular periods.  No other complaints.        RLQ Pain  This is a new problem. The current episode started yesterday. The onset quality is sudden. The problem occurs constantly. The problem has been gradually worsening. The pain is located in the RLQ. The pain is at a severity of 7/10. The quality of the pain is cramping, a sensation of fullness and aching. The abdominal pain does not radiate. Associated symptoms include anorexia. Pertinent negatives include no constipation, diarrhea, dysuria, fever, frequency, hematuria, melena, nausea or vomiting. The pain is aggravated by certain positions, deep breathing and palpation. The pain is relieved by Nothing. Treatments tried: Ibuprofen. The treatment provided no relief.  "      Review of Systems   Constitutional:  Negative for fever.   Gastrointestinal:  Positive for abdominal pain (Right lower quadrant pain) and anorexia. Negative for blood in stool, constipation, diarrhea, heartburn, melena, nausea and vomiting.   Genitourinary:  Negative for dysuria, frequency, hematuria and urgency.   All other systems reviewed and are negative.             Objective     BP 90/64 (BP Location: Left arm, Patient Position: Sitting, BP Cuff Size: Adult)   Pulse 76   Temp 36.4 °C (97.6 °F) (Temporal)   Resp 16   Ht 1.651 m (5' 5\")   Wt 84.9 kg (187 lb 3.2 oz)   SpO2 98%   BMI 31.15 kg/m²      Physical Exam  Vitals and nursing note reviewed.   Constitutional:       General: She is not in acute distress.     Appearance: Normal appearance. She is well-developed. She is not ill-appearing or toxic-appearing.   HENT:      Head: Normocephalic and atraumatic.      Right Ear: Tympanic membrane normal.      Left Ear: Tympanic membrane normal.      Nose: Nose normal.      Mouth/Throat:      Lips: Pink.      Mouth: Mucous membranes are moist.      Pharynx: Oropharynx is clear. Uvula midline.   Eyes:      Extraocular Movements: Extraocular movements intact.      Conjunctiva/sclera: Conjunctivae normal.      Pupils: Pupils are equal, round, and reactive to light.   Cardiovascular:      Rate and Rhythm: Normal rate and regular rhythm.      Pulses: Normal pulses.      Heart sounds: Normal heart sounds.   Pulmonary:      Effort: Pulmonary effort is normal.      Breath sounds: Normal breath sounds.   Abdominal:      General: Abdomen is flat. Bowel sounds are normal. There is no distension.      Palpations: Abdomen is soft.      Tenderness: There is abdominal tenderness in the right lower quadrant. There is no guarding or rebound. Positive signs include McBurney's sign. Negative signs include Chang's sign, Rovsing's sign, psoas sign and obturator sign.      Comments: Patient has positive McBurney sign, negative " rebound or guarding.   Musculoskeletal:         General: Normal range of motion.      Cervical back: Normal range of motion and neck supple.   Skin:     General: Skin is warm and dry.      Capillary Refill: Capillary refill takes less than 2 seconds.   Neurological:      General: No focal deficit present.      Mental Status: She is alert and oriented to person, place, and time.      Cranial Nerves: No cranial nerve deficit.      Motor: Motor function is intact.      Coordination: Coordination is intact.      Gait: Gait normal.   Psychiatric:         Mood and Affect: Mood normal.                             Assessment & Plan        1. RLQ abdominal pain  POCT Urinalysis          This is a  new problem with uncertain prognosis.    UA: Negative, pregnancy: Negative       Patient HPI and physical exam is positive for right lower quadrant pain, positive McBurney sign.  It is very concerning for acute appendicitis though ovarian cyst is in the differential diagnosis.    PT requires evaluation and treatment at a facility that can provide a higher level of care due to acuity of illness/complaint.  Patient states she will go to [facility] emergency room for further evaluation.    Patient instructed to remain n.p.o. until seen in the ER.    Differential diagnosis, supportive care, and indications for immediate follow-up discussed with patient.  Instructed to return to clinic or nearest emergency department for any change in condition, further concerns, or worsening of symptoms.    I personally reviewed prior external notes and test results pertinent to today's visit.  I have independently reviewed and interpreted all diagnostics ordered during this urgent care visit.    PT should follow up with PCP in 1-2 days for re-evaluation if symptoms have not improved.      Discussed red flags and reasons to return to UC or ED.      Pt and/or family verbalized understanding of diagnosis and follow up instructions and was offered  informational handout on diagnosis.  PT discharged.     Please note that this dictation was created using voice recognition software. I have made every reasonable attempt to correct obvious errors, but I expect that there may be errors of grammar and possibly content that I did not discover before finalizing the note.

## 2024-06-24 ASSESSMENT — ENCOUNTER SYMPTOMS
VOMITING: 0
NAUSEA: 0
ABDOMINAL PAIN: 1
FEVER: 0
CONSTIPATION: 0
DIARRHEA: 0
BLOOD IN STOOL: 0
ANOREXIA: 1
HEARTBURN: 0

## 2025-02-06 ENCOUNTER — OFFICE VISIT (OUTPATIENT)
Dept: URGENT CARE | Facility: CLINIC | Age: 50
End: 2025-02-06
Payer: COMMERCIAL

## 2025-02-06 VITALS
HEIGHT: 65 IN | DIASTOLIC BLOOD PRESSURE: 74 MMHG | SYSTOLIC BLOOD PRESSURE: 122 MMHG | BODY MASS INDEX: 29.16 KG/M2 | TEMPERATURE: 97.9 F | OXYGEN SATURATION: 95 % | WEIGHT: 175 LBS | RESPIRATION RATE: 16 BRPM | HEART RATE: 93 BPM

## 2025-02-06 DIAGNOSIS — S16.1XXA ACUTE CERVICAL MYOFASCIAL STRAIN, INITIAL ENCOUNTER: ICD-10-CM

## 2025-02-06 PROCEDURE — 99213 OFFICE O/P EST LOW 20 MIN: CPT | Mod: 25

## 2025-02-06 PROCEDURE — 96372 THER/PROPH/DIAG INJ SC/IM: CPT

## 2025-02-06 RX ORDER — IBUPROFEN 600 MG/1
600 TABLET, FILM COATED ORAL EVERY 8 HOURS PRN
Qty: 30 TABLET | Refills: 0 | Status: SHIPPED | OUTPATIENT
Start: 2025-02-06

## 2025-02-06 RX ORDER — TIZANIDINE HYDROCHLORIDE 2 MG/1
2 CAPSULE, GELATIN COATED ORAL 3 TIMES DAILY
Qty: 15 CAPSULE | Refills: 0 | Status: SHIPPED | OUTPATIENT
Start: 2025-02-06 | End: 2025-02-14

## 2025-02-06 RX ORDER — LIDOCAINE 50 MG/G
1 PATCH TOPICAL EVERY 24 HOURS
Qty: 20 PATCH | Refills: 0 | Status: SHIPPED | OUTPATIENT
Start: 2025-02-06

## 2025-02-06 RX ORDER — KETOROLAC TROMETHAMINE 15 MG/ML
15 INJECTION, SOLUTION INTRAMUSCULAR; INTRAVENOUS ONCE
Status: COMPLETED | OUTPATIENT
Start: 2025-02-06 | End: 2025-02-06

## 2025-02-06 RX ORDER — ACETAMINOPHEN 500 MG
500-1000 TABLET ORAL EVERY 8 HOURS PRN
Qty: 30 TABLET | Refills: 0 | Status: SHIPPED | OUTPATIENT
Start: 2025-02-06

## 2025-02-06 RX ADMIN — KETOROLAC TROMETHAMINE 15 MG: 15 INJECTION, SOLUTION INTRAMUSCULAR; INTRAVENOUS at 21:19

## 2025-02-06 RX ADMIN — KETOROLAC TROMETHAMINE 15 MG: 15 INJECTION, SOLUTION INTRAMUSCULAR; INTRAVENOUS at 21:18

## 2025-02-06 ASSESSMENT — ENCOUNTER SYMPTOMS
BACK PAIN: 0
MYALGIAS: 0
NECK PAIN: 1

## 2025-02-07 NOTE — PROGRESS NOTES
"Subjective:   Syeda Norton is a 49 y.o. female who presents for Neck Pain (X3 wks)      Patient presents with complaints of right side neck pain for the last 3 weeks.  Patient states that she has this pain and \"crick\" in her neck for many years.  States that she goes to the chiropractor about every 4 to 6 months and usually symptoms resolved.  Patient states she went to an new chiropractor this time, feels that they did not do manipulation correctly and has been having soreness and pain since.  She localizes pain to her right trap and semispinalis on her right side of her neck.  She denies any numbness tingling weakness, or any other focal neurological issues.  She has been taking Tylenol and ibuprofen states that has not been helpful.    Neck Pain         Review of Systems   Musculoskeletal:  Positive for neck pain. Negative for back pain, joint pain and myalgias.   All other systems reviewed and are negative.    Refer to HPI for additional details.    During this visit, appropriate PPE was worn, and hand hygiene was performed.    PMH:  has no past medical history on file.    MEDS:   Current Outpatient Medications:     tizanidine (ZANAFLEX) 2 MG capsule, Take 1 Capsule by mouth 3 times a day., Disp: 15 Capsule, Rfl: 0    ibuprofen (MOTRIN) 600 MG Tab, Take 1 Tablet by mouth every 8 hours as needed for Mild Pain, Moderate Pain or Inflammation (alternate with acetaminophen)., Disp: 30 Tablet, Rfl: 0    acetaminophen (TYLENOL) 500 MG Tab, Take 1-2 Tablets by mouth every 8 hours as needed for Mild Pain or Moderate Pain., Disp: 30 Tablet, Rfl: 0    lidocaine (LIDODERM) 5 % Patch, Place 1 Patch on the skin every 24 hours. Apply to affected area, 24 hours on followed by 24 hours off., Disp: 20 Patch, Rfl: 0    propranolol (INDERAL) 40 MG Tab, Take 20-40 mg by mouth 1 time a day as needed (anxiety)., Disp: , Rfl:     methylPREDNISolone (MEDROL DOSEPAK) 4 MG Tablet Therapy Pack, Follow schedule on package " "instructions. (Patient not taking: Reported on 2/6/2025), Disp: 21 Tablet, Rfl: 0    ALLERGIES: No Known Allergies  SURGHX: History reviewed. No pertinent surgical history.  SOCHX:  reports that she has never smoked. She has never used smokeless tobacco. She reports current alcohol use. She reports that she does not use drugs.    FH: Per HPI as applicable/pertinent.    Medications, Allergies, and current problem list reviewed today in Epic.     Objective:     /74   Pulse 93   Temp 36.6 °C (97.9 °F) (Temporal)   Resp 16   Ht 1.651 m (5' 5\")   Wt 79.4 kg (175 lb)   SpO2 95%     Physical Exam  Vitals reviewed.   Constitutional:       General: She is not in acute distress.     Appearance: She is normal weight. She is not ill-appearing.   HENT:      Head: Normocephalic and atraumatic.   Cardiovascular:      Rate and Rhythm: Normal rate.      Pulses: Normal pulses.   Pulmonary:      Effort: Pulmonary effort is normal.   Musculoskeletal:      Cervical back: Spasms and tenderness present. No swelling, edema, deformity, erythema, signs of trauma, lacerations, rigidity, torticollis, bony tenderness or crepitus. No pain with movement. Decreased range of motion.      Thoracic back: No tenderness or bony tenderness. Normal range of motion.      Lumbar back: No swelling, tenderness or bony tenderness. Normal range of motion. Negative right straight leg raise test and negative left straight leg raise test.        Back:       Comments: Spasm, tenderness to palpation, decreased range of motion particularly with right rotation of the neck.  No step-off, crepitus, obvious deformity noted   Neurological:      Mental Status: She is alert.         Assessment/Plan:     Diagnosis and associated orders:     1. Acute cervical myofascial strain, initial encounter  - ketorolac (Toradol) 15 MG/ML injection 15 mg  - ketorolac (Toradol) 15 MG/ML injection 15 mg  - tizanidine (ZANAFLEX) 2 MG capsule; Take 1 Capsule by mouth 3 times a " day.  Dispense: 15 Capsule; Refill: 0  - ibuprofen (MOTRIN) 600 MG Tab; Take 1 Tablet by mouth every 8 hours as needed for Mild Pain, Moderate Pain or Inflammation (alternate with acetaminophen).  Dispense: 30 Tablet; Refill: 0  - acetaminophen (TYLENOL) 500 MG Tab; Take 1-2 Tablets by mouth every 8 hours as needed for Mild Pain or Moderate Pain.  Dispense: 30 Tablet; Refill: 0  - lidocaine (LIDODERM) 5 % Patch; Place 1 Patch on the skin every 24 hours. Apply to affected area, 24 hours on followed by 24 hours off.  Dispense: 20 Patch; Refill: 0     Comments/MDM:     Patient history and physical exam acute myofascial cervical strain.  Patient presents well in clinic no red flag symptoms endorsed found on physical exam.  Discussed HPI as well as above mentioned physical exam findings with patient, do have high suspicion for strain and musculoskeletal involvement given lack of any bony abnormalities and no focal neurological issues.  I did mention to patient that treatment should be focused on reducing inflammation and addressing these issues.  Patient is on board with plan of care  30 mg Toradol given in clinic  Outpatient management will consist of stacked Tylenol and ibuprofen, as needed Zanaflex for muscle spasms and pain, adequate hydration, no strenuous exercise, gentle stretching, monitor symptoms  Follow up in 3-5 days if no improvement in symptoms  ER precautions for any new or worsening symptoms, or neurological involvement         Differential diagnosis, natural history, supportive care, and indications for immediate follow-up discussed.    Advised the patient to follow-up with the primary care physician for recheck, reevaluation, and consideration of further management.    Please note that this dictation was created using voice recognition software. I have made a reasonable attempt to correct obvious errors, but I expect that there are errors of grammar and possibly content that I did not discover before  finalizing the note.    This note was electronically signed by KATI Mcfarlane

## 2025-02-11 ENCOUNTER — TELEPHONE (OUTPATIENT)
Dept: URGENT CARE | Facility: CLINIC | Age: 50
End: 2025-02-11
Payer: COMMERCIAL

## 2025-02-11 NOTE — TELEPHONE ENCOUNTER
"Donny from [facility] called stating that he advised Syeda that Tizanidine requires a prior auth; she declined and the pharmicist stated she told him \"Nevermind I will just take my friend's\"    Is there a way you can send an alt medication?  "

## 2025-02-14 DIAGNOSIS — S16.1XXA ACUTE CERVICAL MYOFASCIAL STRAIN, INITIAL ENCOUNTER: ICD-10-CM

## 2025-02-14 RX ORDER — CYCLOBENZAPRINE HCL 5 MG
5-10 TABLET ORAL
Qty: 15 TABLET | Refills: 0 | Status: SHIPPED | OUTPATIENT
Start: 2025-02-14

## 2025-02-14 NOTE — PROGRESS NOTES
Telephone Appointment Visit   This telephone visit was initiated by the patient and they verbally consented.    Reason for Call:  Symptom Follow-up    HPI:    Myofascial sprain/strain of the cervical region, neck pain    Labs / Images Reviewed:   N/A    Assessment and Plan:     1. Acute cervical myofascial strain, initial encounter      Follow-up:     Patient is having difficulties picking up her prescription, was finally able to use some of her friends medication.  I did call patient and advise that she stop using friends medication, sent new prescription for Flexeril advised to continue ibuprofen, Tylenol, lidocaine patches as well as Flexeril as needed.  Follow-up if symptoms do not improve  ER precautions for any worrisome/neurological symptoms    Total Time Spent (mins): 10    JAMEEL Mcfarlane.

## 2025-03-13 ENCOUNTER — HOSPITAL ENCOUNTER (OUTPATIENT)
Facility: MEDICAL CENTER | Age: 50
End: 2025-03-13
Attending: NURSE PRACTITIONER
Payer: COMMERCIAL

## 2025-03-13 ENCOUNTER — HOSPITAL ENCOUNTER (OUTPATIENT)
Dept: RADIOLOGY | Facility: MEDICAL CENTER | Age: 50
End: 2025-03-13
Attending: NURSE PRACTITIONER
Payer: COMMERCIAL

## 2025-03-13 ENCOUNTER — RESULTS FOLLOW-UP (OUTPATIENT)
Dept: URGENT CARE | Facility: PHYSICIAN GROUP | Age: 50
End: 2025-03-13

## 2025-03-13 ENCOUNTER — OFFICE VISIT (OUTPATIENT)
Dept: URGENT CARE | Facility: PHYSICIAN GROUP | Age: 50
End: 2025-03-13
Payer: COMMERCIAL

## 2025-03-13 VITALS
RESPIRATION RATE: 16 BRPM | TEMPERATURE: 98.1 F | HEART RATE: 89 BPM | SYSTOLIC BLOOD PRESSURE: 108 MMHG | DIASTOLIC BLOOD PRESSURE: 70 MMHG | HEIGHT: 65 IN | OXYGEN SATURATION: 96 % | BODY MASS INDEX: 29.79 KG/M2 | WEIGHT: 178.8 LBS

## 2025-03-13 DIAGNOSIS — R10.2 PELVIC PAIN: ICD-10-CM

## 2025-03-13 LAB
AMBIGUOUS DTTM AMBI4: NORMAL
APPEARANCE UR: CLEAR
BILIRUB UR STRIP-MCNC: NEGATIVE MG/DL
CANDIDA DNA VAG QL PROBE+SIG AMP: NEGATIVE
COLOR UR AUTO: YELLOW
G VAGINALIS DNA VAG QL PROBE+SIG AMP: NEGATIVE
GLUCOSE UR STRIP.AUTO-MCNC: NEGATIVE MG/DL
KETONES UR STRIP.AUTO-MCNC: NEGATIVE MG/DL
LEUKOCYTE ESTERASE UR QL STRIP.AUTO: NEGATIVE
NITRITE UR QL STRIP.AUTO: NEGATIVE
PH UR STRIP.AUTO: 6.5 [PH] (ref 5–8)
POCT INT CON NEG: NEGATIVE
POCT INT CON POS: POSITIVE
POCT URINE PREGNANCY TEST: NEGATIVE
PROT UR QL STRIP: NEGATIVE MG/DL
RBC UR QL AUTO: NEGATIVE
SP GR UR STRIP.AUTO: 1.02
T VAGINALIS DNA VAG QL PROBE+SIG AMP: NEGATIVE
UROBILINOGEN UR STRIP-MCNC: 0.2 MG/DL

## 2025-03-13 PROCEDURE — 87591 N.GONORRHOEAE DNA AMP PROB: CPT

## 2025-03-13 PROCEDURE — 87480 CANDIDA DNA DIR PROBE: CPT

## 2025-03-13 PROCEDURE — 87660 TRICHOMONAS VAGIN DIR PROBE: CPT

## 2025-03-13 PROCEDURE — 3078F DIAST BP <80 MM HG: CPT | Performed by: NURSE PRACTITIONER

## 2025-03-13 PROCEDURE — 1125F AMNT PAIN NOTED PAIN PRSNT: CPT | Performed by: NURSE PRACTITIONER

## 2025-03-13 PROCEDURE — 3074F SYST BP LT 130 MM HG: CPT | Performed by: NURSE PRACTITIONER

## 2025-03-13 PROCEDURE — 76830 TRANSVAGINAL US NON-OB: CPT

## 2025-03-13 PROCEDURE — 81025 URINE PREGNANCY TEST: CPT | Performed by: NURSE PRACTITIONER

## 2025-03-13 PROCEDURE — 81002 URINALYSIS NONAUTO W/O SCOPE: CPT | Performed by: NURSE PRACTITIONER

## 2025-03-13 PROCEDURE — 99214 OFFICE O/P EST MOD 30 MIN: CPT | Performed by: NURSE PRACTITIONER

## 2025-03-13 PROCEDURE — 87491 CHLMYD TRACH DNA AMP PROBE: CPT

## 2025-03-13 PROCEDURE — 87510 GARDNER VAG DNA DIR PROBE: CPT

## 2025-03-13 ASSESSMENT — ENCOUNTER SYMPTOMS
NAUSEA: 0
ABDOMINAL PAIN: 1
FEVER: 0
MYALGIAS: 1

## 2025-03-13 ASSESSMENT — PAIN SCALES - GENERAL: PAINLEVEL_OUTOF10: 8=MODERATE-SEVERE PAIN

## 2025-03-13 NOTE — PROGRESS NOTES
"Patient/parent/guardian has consented to treatment and for use of patient information for treatment and billing purposes.  Subjective:   Syeda Norton  is a 49 y.o. female who presents for Bladder Problem (Pain and back pain started today )       Pelvic Pain  This is a new problem. The current episode started today. The problem occurs constantly. The problem has been rapidly worsening. Associated symptoms include abdominal pain and myalgias. Pertinent negatives include no fever, nausea or urinary symptoms. Associated symptoms comments: suprapubic. Nothing aggravates the symptoms. She has tried NSAIDs for the symptoms. The treatment provided mild relief.    Awoke this morning at 4 AM with sudden onset was concerned for UTI symptoms.  Denies dysuria, frequency, urgency, or hematuria.  Pain is reported as suprapubic, radiating to the low back.  She does have an IUD, the Mirena.  Last sexual encounter was in December, with her .  No concern for STD.  Does have a history of BV.  Denies odor or discharge.    Review of Systems   Constitutional:  Negative for fever.   Gastrointestinal:  Positive for abdominal pain. Negative for nausea.   Genitourinary:  Positive for pelvic pain.   Musculoskeletal:  Positive for myalgias.         CURRENT MEDICATIONS:  acetaminophen Tabs  cyclobenzaprine  ibuprofen Tabs  lidocaine Ptch  methylPREDNISolone Tbpk  propranolol Tabs  Allergies:   No Known Allergies  Current Problems: Syeda Norton does not have a problem list on file.  Past Surgical Hx:  No past surgical history on file.   Past Social Hx:  reports that she has never smoked. She has never used smokeless tobacco. She reports current alcohol use. She reports that she does not use drugs.    Objective:   /70 (BP Location: Left arm, Patient Position: Sitting, BP Cuff Size: Adult)   Pulse 89   Temp 36.7 °C (98.1 °F) (Temporal)   Resp 16   Ht 1.651 m (5' 5\")   Wt 81.1 kg (178 lb 12.8 oz)   SpO2 96%  "  BMI 29.75 kg/m²   Physical Exam  Vitals and nursing note reviewed.   Constitutional:       General: She is not in acute distress.     Appearance: Normal appearance. She is not ill-appearing.   HENT:      Head: Normocephalic and atraumatic.      Right Ear: External ear normal.      Left Ear: External ear normal.      Nose: Nose normal.   Eyes:      Extraocular Movements: Extraocular movements intact.      Conjunctiva/sclera: Conjunctivae normal.      Pupils: Pupils are equal, round, and reactive to light.   Cardiovascular:      Rate and Rhythm: Normal rate.   Pulmonary:      Effort: Pulmonary effort is normal.   Abdominal:      General: There is no distension.      Tenderness: There is abdominal tenderness in the right lower quadrant and suprapubic area. There is guarding and rebound. There is no right CVA tenderness or left CVA tenderness.      Hernia: No hernia is present.   Musculoskeletal:         General: Normal range of motion.      Cervical back: Normal range of motion.   Skin:     General: Skin is warm and dry.   Neurological:      General: No focal deficit present.      Mental Status: She is alert.       Results for orders placed or performed in visit on 03/13/25   POCT Urinalysis    Collection Time: 03/13/25  8:33 AM   Result Value Ref Range    POC Color Yellow Negative    POC Appearance Clear Negative    POC Glucose Negative Negative mg/dL    POC Bilirubin Negative Negative mg/dL    POC Ketones Negative Negative mg/dL    POC Specific Gravity 1.025 <1.005 - >1.030    POC Blood Negative Negative    POC Urine PH 6.5 5.0 - 8.0    POC Protein Negative Negative mg/dL    POC Urobiligen 0.2 Negative (0.2) mg/dL    POC Nitrites Negative Negative    POC Leukocyte Esterase Negative Negative   POCT PREGNANCY    Collection Time: 03/13/25  8:50 AM   Result Value Ref Range    POC Urine Pregnancy Test Negative     Internal Control Positive Positive     Internal Control Negative Negative        Assessment/Plan:   1.  Pelvic pain  - POCT Urinalysis  - POCT PREGNANCY  - VAGINAL PATHOGENS DNA PANEL; Future  - Chlamydia/GC, PCR (Genital/Anal swab); Future  - US-PELVIC COMPLETE (TRANSABDOMINAL/TRANSVAGINAL) (COMBO); Future    49-year-old female presents with concern for suprapubic pain.  Vital signs stable, afebrile.  No acute distress does not appear ill.  Abdominal exam demonstrates tenderness with slight rebound and guarding of the right lower quadrant, and suprapubic region.  Discussed differential diagnosis with patient including appendicitis, IUD problem, and cystitis.  Point-of-care UA negative, point-of-care pregnancy negative.  Vaginal path and GC chlamydia ordered.  Stat transvaginal ultrasound ordered for today.  Will contact patient with results when available.  May use Tylenol and ibuprofen today for discomfort.  Strict ER precautions if pain increases in intensity.  Red flags, RTC and ER precautions discussed, with patient confirming their understanding of  need for immediate follow-up.  Discussed medication management options, risks and benefits, and alternatives to treatment plan agreed upon. Instructed to continue  medications without changes as ordered by primary care unless aforementioned above.  Patient expresses understanding and agrees to plan of care. All questions or concerns answered. For my MDM, I have personally reviewed previous notes, and test results as pertinent to today's visit.    Please note that this dictation was created using voice recognition software. I have made every reasonable attempt to correct obvious errors,  but there may be grammar errors, and possibly content that I did not discover before finalizing the note.   This note was electronically signed by KATI Shaffer

## 2025-03-14 ENCOUNTER — RESULTS FOLLOW-UP (OUTPATIENT)
Dept: URGENT CARE | Facility: PHYSICIAN GROUP | Age: 50
End: 2025-03-14
Payer: COMMERCIAL

## 2025-03-14 LAB
C TRACH DNA GENITAL QL NAA+PROBE: NEGATIVE
N GONORRHOEA DNA GENITAL QL NAA+PROBE: NEGATIVE
SPECIMEN SOURCE: NORMAL

## 2025-05-04 ENCOUNTER — OFFICE VISIT (OUTPATIENT)
Dept: URGENT CARE | Facility: CLINIC | Age: 50
End: 2025-05-04
Payer: COMMERCIAL

## 2025-05-04 VITALS
OXYGEN SATURATION: 98 % | BODY MASS INDEX: 29.16 KG/M2 | HEART RATE: 94 BPM | RESPIRATION RATE: 20 BRPM | DIASTOLIC BLOOD PRESSURE: 70 MMHG | WEIGHT: 175 LBS | TEMPERATURE: 97.5 F | HEIGHT: 65 IN | SYSTOLIC BLOOD PRESSURE: 108 MMHG

## 2025-05-04 DIAGNOSIS — Z76.0 MEDICATION REFILL: ICD-10-CM

## 2025-05-04 DIAGNOSIS — L02.31 LEFT BUTTOCK ABSCESS: ICD-10-CM

## 2025-05-04 PROCEDURE — 3078F DIAST BP <80 MM HG: CPT | Performed by: NURSE PRACTITIONER

## 2025-05-04 PROCEDURE — 3074F SYST BP LT 130 MM HG: CPT | Performed by: NURSE PRACTITIONER

## 2025-05-04 PROCEDURE — 99214 OFFICE O/P EST MOD 30 MIN: CPT | Performed by: NURSE PRACTITIONER

## 2025-05-04 RX ORDER — SULFAMETHOXAZOLE AND TRIMETHOPRIM 800; 160 MG/1; MG/1
1 TABLET ORAL 2 TIMES DAILY
Qty: 10 TABLET | Refills: 0 | Status: SHIPPED | OUTPATIENT
Start: 2025-05-04 | End: 2025-05-09

## 2025-05-04 RX ORDER — PROPRANOLOL HYDROCHLORIDE 40 MG/1
20-40 TABLET ORAL
Qty: 90 TABLET | Refills: 0 | Status: SHIPPED | OUTPATIENT
Start: 2025-05-04

## 2025-05-04 ASSESSMENT — ENCOUNTER SYMPTOMS
EYE PAIN: 0
DIZZINESS: 0
VOMITING: 0
NAUSEA: 0
CHILLS: 0
SHORTNESS OF BREATH: 0
SORE THROAT: 0
MYALGIAS: 0
FEVER: 0

## 2025-05-04 NOTE — PROGRESS NOTES
Subjective:   Syeda Norton is a 49 y.o. female who presents for Bump (Patient states Rapidly growing carbuncle Along my underwear line. It is oozing puss that smells and is bleeding in one area. Concern as my grandmother  from one bursting inward. Requesting female Doc if possible as she will need to look at my private area. Patient also requesting a refill on propranolol )      HPI  Patient is a 49-year-old female presents urgent care for evaluation of a bump that has been draining pus and blood in her genital region.  Patient has a history of a cystlike lesion in her vaginal region which she did have surgically removed concerned that it may be coming back.  She has no fever or chills.  Patient also requesting a medication refill of her propranolol which she takes for anxiety  Review of Systems   Constitutional:  Negative for chills, fever and malaise/fatigue.   HENT:  Negative for sore throat.    Eyes:  Negative for pain.   Respiratory:  Negative for shortness of breath.    Cardiovascular:  Negative for chest pain.   Gastrointestinal:  Negative for nausea and vomiting.   Genitourinary:  Negative for hematuria.        Painful bump in her vaginal region draining   Musculoskeletal:  Negative for myalgias.   Skin:  Negative for rash.   Neurological:  Negative for dizziness.       Medications:    acetaminophen Tabs  cyclobenzaprine  ibuprofen Tabs  lidocaine Ptch  methylPREDNISolone Tbpk  propranolol Tabs  sulfamethoxazole-trimethoprim    Allergies: Patient has no known allergies.    Problem List: Syeda Norton does not have a problem list on file.    Surgical History:  No past surgical history on file.    Past Social Hx: Syeda Norton  reports that she has never smoked. She has never used smokeless tobacco. She reports current alcohol use. She reports that she does not use drugs.     Past Family Hx:  Syeda Norton family history is not on file.     Problem list,  "medications, and allergies reviewed by myself today in Epic.     Objective:     /70   Pulse 94   Temp 36.4 °C (97.5 °F) (Temporal)   Resp 20   Ht 1.651 m (5' 5\")   Wt 79.4 kg (175 lb)   SpO2 98%   BMI 29.12 kg/m²     Physical Exam  Constitutional:       Appearance: Normal appearance. She is not ill-appearing or toxic-appearing.   HENT:      Head: Normocephalic.      Right Ear: External ear normal.      Left Ear: External ear normal.      Nose: Nose normal.      Mouth/Throat:      Lips: Pink.   Eyes:      General: Lids are normal.   Pulmonary:      Effort: Pulmonary effort is normal. No accessory muscle usage.   Genitourinary:     Labia:         Right: No rash, tenderness, lesion or injury.         Left: No rash, tenderness, lesion or injury.    Musculoskeletal:      Cervical back: Full passive range of motion without pain.   Lymphadenopathy:      Lower Body: Left inguinal adenopathy present.   Skin:     Findings: Abscess present. No wound.      Comments: Area of induration in the left inner buttock region no fluctuation no discharge minimally erythematous.  Just proximal to this area 1mm cystlike lesion no discharge no induration of fluctuation.   Neurological:      Mental Status: She is alert and oriented to person, place, and time.   Psychiatric:         Mood and Affect: Mood normal.         Thought Content: Thought content normal.         Assessment/Plan:     Diagnosis and associated orders:   1. Left buttock abscess  sulfamethoxazole-trimethoprim (BACTRIM DS) 800-160 MG tablet      2. Medication refill  propranolol (INDERAL) 40 MG Tab               Comments/MDM:     I personally reviewed prior external notes and prior test results pertinent to today's visit.  I&D not indicated on today's exam.  Patient restarted with antibiotics.  Medication was refilled on today's exam for her anxiety  Discussed management options, risks and benefits, and alternatives to treatment plan agreed upon.   Red flags " discussed and indications to immediately call 911 or present to the Emergency Department.   Supportive care, differential diagnoses, and indications for immediate follow-up discussed with patient.    Patient expresses understanding and agrees to plan. Patient denies any other questions or concerns.              Differential diagnosis, natural history, supportive care, and indications for immediate follow-up discussed.    Advised the patient to follow-up with the primary care physician for recheck, reevaluation, and consideration of further management.    Please note that this dictation was created using voice recognition software. I have made a reasonable attempt to correct obvious errors, but I expect that there are errors of grammar and possibly content that I did not discover before finalizing the note.    This note was electronically signed by Ziggy ZHENG.